# Patient Record
Sex: MALE | Race: WHITE | NOT HISPANIC OR LATINO | Employment: FULL TIME | URBAN - METROPOLITAN AREA
[De-identification: names, ages, dates, MRNs, and addresses within clinical notes are randomized per-mention and may not be internally consistent; named-entity substitution may affect disease eponyms.]

---

## 2017-10-04 ENCOUNTER — CLINICAL SUPPORT (OUTPATIENT)
Dept: OCCUPATIONAL MEDICINE | Facility: CLINIC | Age: 77
End: 2017-10-04

## 2017-10-04 DIAGNOSIS — Z02.83 ENCOUNTER FOR DRUG SCREENING: Primary | ICD-10-CM

## 2017-10-04 DIAGNOSIS — Z13.39 ALCOHOL SCREENING: ICD-10-CM

## 2017-10-04 LAB — POC BREATH ALCOHOL: NEGATIVE

## 2017-10-04 PROCEDURE — 80305 DRUG TEST PRSMV DIR OPT OBS: CPT | Mod: S$GLB,,, | Performed by: EMERGENCY MEDICINE

## 2017-10-04 PROCEDURE — 82075 ASSAY OF BREATH ETHANOL: CPT | Mod: S$GLB,,, | Performed by: EMERGENCY MEDICINE

## 2017-10-04 NOTE — PROGRESS NOTES
Subjective:       Patient ID: Justice Nickerson is a 77 y.o. male.    Chief Complaint: Drug / Alcohol Assessment    HPI  ROS    Objective:      Physical Exam    Assessment:       No diagnosis found.    Plan:       Patient here for random alcohol and drug screening            No Follow-up on file.